# Patient Record
Sex: MALE | Race: WHITE
[De-identification: names, ages, dates, MRNs, and addresses within clinical notes are randomized per-mention and may not be internally consistent; named-entity substitution may affect disease eponyms.]

---

## 2020-04-01 ENCOUNTER — APPOINTMENT (OUTPATIENT)
Dept: MRI IMAGING | Facility: CLINIC | Age: 60
End: 2020-04-01
Payer: OTHER GOVERNMENT

## 2020-04-01 PROBLEM — Z00.00 ENCOUNTER FOR PREVENTIVE HEALTH EXAMINATION: Status: ACTIVE | Noted: 2020-04-01

## 2020-04-01 PROCEDURE — 70551 MRI BRAIN STEM W/O DYE: CPT

## 2020-04-15 ENCOUNTER — APPOINTMENT (OUTPATIENT)
Dept: MRI IMAGING | Facility: CLINIC | Age: 60
End: 2020-04-15
Payer: OTHER GOVERNMENT

## 2020-04-15 PROCEDURE — 74183 MRI ABD W/O CNTR FLWD CNTR: CPT

## 2020-04-15 PROCEDURE — A9585A: CUSTOM

## 2020-05-22 ENCOUNTER — APPOINTMENT (OUTPATIENT)
Dept: MRI IMAGING | Facility: CLINIC | Age: 60
End: 2020-05-22
Payer: OTHER GOVERNMENT

## 2020-05-22 PROCEDURE — 70553 MRI BRAIN STEM W/O & W/DYE: CPT

## 2020-07-08 ENCOUNTER — APPOINTMENT (OUTPATIENT)
Dept: SPINE | Facility: CLINIC | Age: 60
End: 2020-07-08
Payer: OTHER GOVERNMENT

## 2020-07-08 VITALS
RESPIRATION RATE: 18 BRPM | OXYGEN SATURATION: 97 % | BODY MASS INDEX: 37.51 KG/M2 | HEART RATE: 78 BPM | DIASTOLIC BLOOD PRESSURE: 76 MMHG | TEMPERATURE: 36.4 F | HEIGHT: 70 IN | WEIGHT: 262 LBS | SYSTOLIC BLOOD PRESSURE: 138 MMHG

## 2020-07-08 DIAGNOSIS — Z63.5 DISRUPTION OF FAMILY BY SEPARATION AND DIVORCE: ICD-10-CM

## 2020-07-08 DIAGNOSIS — Z87.891 PERSONAL HISTORY OF NICOTINE DEPENDENCE: ICD-10-CM

## 2020-07-08 DIAGNOSIS — Z86.39 PERSONAL HISTORY OF OTHER ENDOCRINE, NUTRITIONAL AND METABOLIC DISEASE: ICD-10-CM

## 2020-07-08 DIAGNOSIS — Z78.9 OTHER SPECIFIED HEALTH STATUS: ICD-10-CM

## 2020-07-08 DIAGNOSIS — Z85.828 PERSONAL HISTORY OF OTHER MALIGNANT NEOPLASM OF SKIN: ICD-10-CM

## 2020-07-08 DIAGNOSIS — Z86.79 PERSONAL HISTORY OF OTHER DISEASES OF THE CIRCULATORY SYSTEM: ICD-10-CM

## 2020-07-08 PROCEDURE — 99244 OFF/OP CNSLTJ NEW/EST MOD 40: CPT

## 2020-07-08 RX ORDER — METFORMIN HYDROCHLORIDE 625 MG/1
TABLET ORAL
Refills: 0 | Status: ACTIVE | COMMUNITY

## 2020-07-08 RX ORDER — TAMSULOSIN HYDROCHLORIDE 0.4 MG/1
0.4 CAPSULE ORAL
Refills: 0 | Status: ACTIVE | COMMUNITY

## 2020-07-08 RX ORDER — CALCIUM CARBONATE/VITAMIN D3 600 MG-10
TABLET ORAL
Refills: 0 | Status: ACTIVE | COMMUNITY

## 2020-07-08 RX ORDER — LISINOPRIL 30 MG/1
TABLET ORAL
Refills: 0 | Status: ACTIVE | COMMUNITY

## 2020-07-08 RX ORDER — EZETIMIBE AND SIMVASTATIN 10; 40 MG/1; MG/1
10-40 TABLET ORAL
Refills: 0 | Status: ACTIVE | COMMUNITY

## 2020-07-08 SDOH — SOCIAL STABILITY - SOCIAL INSECURITY: DISRUPTION OF FAMILY BY SEPARATION AND DIVORCE: Z63.5

## 2020-07-08 NOTE — REVIEW OF SYSTEMS
[As Noted in HPI] : as noted in HPI [Negative] : Heme/Lymph [FreeTextEntry7] : nausea and poor appetite [FreeTextEntry9] : right leg weakness.

## 2020-07-08 NOTE — PHYSICAL EXAM
[Person] : oriented to person [Place] : oriented to place [Short Term Intact] : short term memory intact [Time] : oriented to time [Remote Intact] : remote memory intact [Span Intact] : the attention span was normal [Concentration Intact] : normal concentrating ability [Fluency] : fluency intact [Current Events] : adequate knowledge of current events [Comprehension] : comprehension intact [Past History] : adequate knowledge of personal past history [Vocabulary] : adequate range of vocabulary [Cranial Nerves Oculomotor (III)] : extraocular motion intact [Cranial Nerves Optic (II)] : visual acuity intact bilaterally,  pupils equal round and reactive to light [Cranial Nerves Trigeminal (V)] : facial sensation intact symmetrically [Cranial Nerves Facial (VII)] : face symmetrical [Cranial Nerves Vestibulocochlear (VIII)] : hearing was intact bilaterally [Cranial Nerves Glossopharyngeal (IX)] : tongue and palate midline [Cranial Nerves Accessory (XI - Cranial And Spinal)] : head turning and shoulder shrug symmetric [Motor Tone] : muscle tone was normal in all four extremities [Cranial Nerves Hypoglossal (XII)] : there was no tongue deviation with protrusion [Motor Strength] : muscle strength was normal in all four extremities [No Muscle Atrophy] : normal bulk in all four extremities [4] : L2 Iliopsoas 4/5 [Sensation Tactile Decrease] : light touch was intact [5] : S1 ankle flexors 5/5 [Abnormal Walk] : normal gait [Balance] : balance was intact [2+] : Patella right 2+ [Tremor] : no tremor present [Past-pointing] : there was no past-pointing

## 2020-07-08 NOTE — REASON FOR VISIT
[New Patient Visit] : a new patient visit [Referred By: _________] : Patient was referred by LINDA [FreeTextEntry1] : The patient is here for evaluation of brain lesions.

## 2020-07-08 NOTE — HISTORY OF PRESENT ILLNESS
[de-identified] : LORIE BEJARANO is a 59 year old gentleman who experienced right leg weakness in April and went to [facility] and was evaluated.  He stated that he was found to have brain lesions.  He was treated with radiotherapy by Dr. Infante.  The patient stated that he received ten treatments of whole brain radiation.  He stated that he has had a poor appetite and develops nausea and dizziness.  He remains with right leg weakness and is doing physical therapy.  he stated that it is slowly improving.\par The patient stated that he was found to have a lesion on his kidney which was benign.  He also had two lesions from his back biopsied which were negative.

## 2020-07-08 NOTE — RESULTS/DATA
[FreeTextEntry1] : Lesions in the left posterior frontal and and right posterior temporal.  Also lesion in the right spinal corpus callosum.

## 2020-07-27 ENCOUNTER — APPOINTMENT (OUTPATIENT)
Dept: MRI IMAGING | Facility: CLINIC | Age: 60
End: 2020-07-27
Payer: OTHER GOVERNMENT

## 2020-07-27 PROCEDURE — A9585A: CUSTOM

## 2020-07-27 PROCEDURE — 70553 MRI BRAIN STEM W/O & W/DYE: CPT

## 2020-09-14 ENCOUNTER — APPOINTMENT (OUTPATIENT)
Dept: MRI IMAGING | Facility: CLINIC | Age: 60
End: 2020-09-14
Payer: OTHER GOVERNMENT

## 2020-09-14 PROCEDURE — 70553 MRI BRAIN STEM W/O & W/DYE: CPT

## 2020-09-14 PROCEDURE — A9585A: CUSTOM

## 2021-01-20 ENCOUNTER — APPOINTMENT (OUTPATIENT)
Dept: MRI IMAGING | Facility: CLINIC | Age: 61
End: 2021-01-20
Payer: OTHER GOVERNMENT

## 2021-01-20 PROCEDURE — A9585A: CUSTOM

## 2021-01-20 PROCEDURE — 70553 MRI BRAIN STEM W/O & W/DYE: CPT

## 2021-01-25 ENCOUNTER — APPOINTMENT (OUTPATIENT)
Dept: SPINE | Facility: CLINIC | Age: 61
End: 2021-01-25

## 2021-01-31 ENCOUNTER — NON-APPOINTMENT (OUTPATIENT)
Age: 61
End: 2021-01-31

## 2021-02-03 ENCOUNTER — APPOINTMENT (OUTPATIENT)
Dept: SPINE | Facility: CLINIC | Age: 61
End: 2021-02-03
Payer: OTHER GOVERNMENT

## 2021-02-03 VITALS
SYSTOLIC BLOOD PRESSURE: 155 MMHG | OXYGEN SATURATION: 96 % | HEART RATE: 59 BPM | DIASTOLIC BLOOD PRESSURE: 73 MMHG | WEIGHT: 250 LBS | TEMPERATURE: 98.21 F | HEIGHT: 70 IN | BODY MASS INDEX: 35.79 KG/M2

## 2021-02-03 PROCEDURE — 99213 OFFICE O/P EST LOW 20 MIN: CPT

## 2021-02-03 PROCEDURE — 99072 ADDL SUPL MATRL&STAF TM PHE: CPT

## 2021-02-17 ENCOUNTER — APPOINTMENT (OUTPATIENT)
Dept: RADIATION ONCOLOGY | Facility: CLINIC | Age: 61
End: 2021-02-17
Payer: OTHER GOVERNMENT

## 2021-02-17 VITALS
HEIGHT: 70 IN | WEIGHT: 123.56 LBS | DIASTOLIC BLOOD PRESSURE: 83 MMHG | BODY MASS INDEX: 17.69 KG/M2 | HEART RATE: 84 BPM | TEMPERATURE: 95.9 F | OXYGEN SATURATION: 98 % | RESPIRATION RATE: 18 BRPM | SYSTOLIC BLOOD PRESSURE: 138 MMHG

## 2021-02-17 DIAGNOSIS — Z87.438 PERSONAL HISTORY OF OTHER DISEASES OF MALE GENITAL ORGANS: ICD-10-CM

## 2021-02-17 DIAGNOSIS — F14.90 COCAINE USE, UNSPECIFIED, UNCOMPLICATED: ICD-10-CM

## 2021-02-17 DIAGNOSIS — Z92.3 PERSONAL HISTORY OF IRRADIATION: ICD-10-CM

## 2021-02-17 DIAGNOSIS — F12.90 CANNABIS USE, UNSPECIFIED, UNCOMPLICATED: ICD-10-CM

## 2021-02-17 PROCEDURE — 99204 OFFICE O/P NEW MOD 45 MIN: CPT | Mod: 25,GC

## 2021-02-17 PROCEDURE — 99072 ADDL SUPL MATRL&STAF TM PHE: CPT

## 2021-02-17 NOTE — REASON FOR VISIT
[Consideration for Non-Curative Therapy] : consideration for non-curative therapy for [Brain Metastasis] : brain metastasis [Family Member] : family member

## 2021-02-19 ENCOUNTER — OUTPATIENT (OUTPATIENT)
Dept: OUTPATIENT SERVICES | Facility: HOSPITAL | Age: 61
LOS: 1 days | Discharge: HOME | End: 2021-02-19
Payer: OTHER GOVERNMENT

## 2021-02-19 PROCEDURE — 77263 THER RADIOLOGY TX PLNG CPLX: CPT

## 2021-02-19 PROCEDURE — 99072 ADDL SUPL MATRL&STAF TM PHE: CPT

## 2021-02-19 NOTE — PHYSICAL EXAM
[Sclera] : the sclera and conjunctiva were normal [Outer Ear] : the ears and nose were normal in appearance [] : no respiratory distress [Heart Rate And Rhythm] : heart rate and rhythm were normal [Cervical Lymph Nodes Enlarged Posterior Bilaterally] : posterior cervical [Supraclavicular Lymph Nodes Enlarged Bilaterally] : supraclavicular [Musculoskeletal - Swelling] : no joint swelling [Cranial Nerves Oculomotor (III)] : the extraocular motions were intact [Cranial Nerves Trigeminal (V)] : sensation to the face and masseter strength were intact [Cranial Nerves Facial (VII)] : facial strength was intact bilaterally [Cranial Nerves Vestibulocochlear (VIII)] : hearing was intact [Cranial Nerves Accessory (XI - Cranial And Spinal)] : shoulder shrug was intact bilaterally [Cranial Nerves Hypoglossal (XII)] : there was no tongue deviation with protrusion [Normal] : coordination was normal [Oriented To Time, Place, And Person] : oriented to person, place, and time [Romberg's Sign] : Romberg's sign was negtive [de-identified] : Strength 5/5 b/l upper extremity, 5/5 in LLE, 4/5 in RLE

## 2021-02-19 NOTE — REVIEW OF SYSTEMS
[Fatigue] : fatigue [Visual Disturbances] : visual disturbances [Hoarseness] : hoarseness [Constipation] : constipation [Diarrhea] : diarrhea [Nocturia] : nocturia [Urinary Frequency] : urinary frequency [Muscle Weakness] : muscle weakness [Disturbance Of Gait] : gait disturbance [Skin Rash] : skin rash [Confused] : confusion [Difficulty Walking] : difficulty walking [Negative] : Allergic/Immunologic [Fever] : no fever [Chills] : no chills [Night Sweats] : no night sweats [Recent Change In Weight] : ~T no recent weight change [Eye Pain] : no eye pain [Red Eyes] : eyes not red [Dry Eyes] : no dryness of the eyes [Dysphagia] : no dysphagia [Loss of Hearing] : no loss of hearing [Nosebleeds] : no nosebleeds [Odynophagia] : no odynophagia [Mucosal Pain] : no mucosal pain [Abdominal Pain] : no abdominal pain [Vomiting] : no vomiting [Urinary Ostomy] : no ~T urinary ostomy present [Joint Pain] : no joint pain [Muscle Pain] : no muscle pain [Skin Wound] : no skin wound [Dizziness] : no dizziness [Fainting] : no fainting [FreeTextEntry3] : Blurred vision [FreeTextEntry8] : Incontinence [FreeTextEntry9] : Walks with cane [de-identified] : On back  [de-identified] : Occasional confusion

## 2021-02-19 NOTE — HISTORY OF PRESENT ILLNESS
[FreeTextEntry1] : Mr. Dale Galvez is a 61 yo with multiple brain lesions w/ unknown primary cancer s/p WBRT w/ Dr. Infante. Today he presents with new areas of enhancement and consideration of further radiation therapy.\par \par Brief Oncological History:\par In April he had focal neurological defects including RLE weakness for which MRI on 4/1/2020 showed findings suspicious for intracranial metastatic disease with the largest lesion within the right temporal lobe. \par \par The lesion within the high left posterior frontal lobe may explain the patient's right-sided leg weakness. There is associated mild mass effect and surrounding vasogenic edema surrounding these lesions. Tumefactive demyelinating lesions cannot be completely excluded. Recommend repeat contrast-enhanced MRI examination of the brain for a more definitive evaluation. \par \par A tissue diagnosis could not be obtained because of COVID crisis.\par Completed a course of WBRT - 10 fractions w/ Dr. Infante at [facility].\par \par MRI on 5/22/2020 showed response in multiple lesions such as the left posterior frontal and right posterior temporal cortical and subcortical regions. The spinal corpus callosum lesions showed mixed response. \par \par Subsequently referred to Dr. Bush for tissue diagnosis. At evaluation, new MRI on 1/20/2021 showed \par Interval development of expansile FLAIR hyperintense and enhancing lesion involving the left cerebral peduncle with associated mild restricted diffusion. Additional similar new lesion involving the left frontal subcortical white matter. New punctate foci of enhancement involving the previously noted right occipital periventricular FLAIR hyperintense region.\par \par \par Patient referred for consideration of GK for metastatic brain lesions of unknown primary. Patient today notes continued R sided weakness which initially improved but now returned. He recently finished a course of treatment of steroids, and notes more recently last 3 weeks of poor handwriting and R hand fine motor control. Otherwise no complaints.

## 2021-03-02 ENCOUNTER — OUTPATIENT (OUTPATIENT)
Dept: OUTPATIENT SERVICES | Facility: HOSPITAL | Age: 61
LOS: 1 days | End: 2021-03-02
Payer: OTHER GOVERNMENT

## 2021-03-02 ENCOUNTER — APPOINTMENT (OUTPATIENT)
Dept: SPINE | Facility: AMBULATORY SURGERY CENTER | Age: 61
End: 2021-03-02
Payer: OTHER GOVERNMENT

## 2021-03-02 ENCOUNTER — APPOINTMENT (OUTPATIENT)
Dept: MRI IMAGING | Facility: IMAGING CENTER | Age: 61
End: 2021-03-02

## 2021-03-02 DIAGNOSIS — G93.89 OTHER SPECIFIED DISORDERS OF BRAIN: ICD-10-CM

## 2021-03-02 DIAGNOSIS — C79.31 SECONDARY MALIGNANT NEOPLASM OF BRAIN: ICD-10-CM

## 2021-03-02 PROCEDURE — 61800 APPLY SRS HEADFRAME ADD-ON: CPT

## 2021-03-02 PROCEDURE — 77432 STEREOTACTIC RADIATION TRMT: CPT

## 2021-03-02 PROCEDURE — 77300 RADIATION THERAPY DOSE PLAN: CPT | Mod: 26

## 2021-03-02 PROCEDURE — 77295 3-D RADIOTHERAPY PLAN: CPT | Mod: 26

## 2021-03-02 PROCEDURE — 61798 SRS CRANIAL LESION COMPLEX: CPT

## 2021-03-02 PROCEDURE — 76498 UNLISTED MR PROCEDURE: CPT

## 2021-03-02 PROCEDURE — A9585: CPT

## 2021-03-02 PROCEDURE — 77334 RADIATION TREATMENT AID(S): CPT | Mod: 26

## 2021-03-03 ENCOUNTER — NON-APPOINTMENT (OUTPATIENT)
Age: 61
End: 2021-03-03

## 2021-03-11 ENCOUNTER — NON-APPOINTMENT (OUTPATIENT)
Age: 61
End: 2021-03-11

## 2021-03-16 DIAGNOSIS — G93.89 OTHER SPECIFIED DISORDERS OF BRAIN: ICD-10-CM

## 2021-03-16 DIAGNOSIS — C79.31 SECONDARY MALIGNANT NEOPLASM OF BRAIN: ICD-10-CM

## 2021-03-17 ENCOUNTER — APPOINTMENT (OUTPATIENT)
Dept: SPINE | Facility: CLINIC | Age: 61
End: 2021-03-17
Payer: OTHER GOVERNMENT

## 2021-03-17 VITALS
HEART RATE: 81 BPM | BODY MASS INDEX: 37.22 KG/M2 | TEMPERATURE: 97.7 F | OXYGEN SATURATION: 94 % | SYSTOLIC BLOOD PRESSURE: 131 MMHG | DIASTOLIC BLOOD PRESSURE: 82 MMHG | HEIGHT: 70 IN | WEIGHT: 260 LBS

## 2021-03-17 DIAGNOSIS — G93.89 OTHER SPECIFIED DISORDERS OF BRAIN: ICD-10-CM

## 2021-03-17 PROCEDURE — 99024 POSTOP FOLLOW-UP VISIT: CPT

## 2021-04-12 ENCOUNTER — APPOINTMENT (OUTPATIENT)
Dept: RADIATION ONCOLOGY | Facility: CLINIC | Age: 61
End: 2021-04-12
Payer: OTHER GOVERNMENT

## 2021-04-12 VITALS
SYSTOLIC BLOOD PRESSURE: 159 MMHG | HEART RATE: 65 BPM | BODY MASS INDEX: 37.31 KG/M2 | OXYGEN SATURATION: 98 % | WEIGHT: 260 LBS | RESPIRATION RATE: 18 BRPM | DIASTOLIC BLOOD PRESSURE: 94 MMHG

## 2021-04-12 PROCEDURE — 99024 POSTOP FOLLOW-UP VISIT: CPT | Mod: GC

## 2021-04-12 RX ORDER — DEXAMETHASONE 1 MG/1
1 TABLET ORAL
Refills: 0 | Status: DISCONTINUED | COMMUNITY
End: 2021-04-12

## 2021-04-12 NOTE — REVIEW OF SYSTEMS
[Fatigue: Grade 1 - Fatigue relieved by rest] : Fatigue: Grade 1 - Fatigue relieved by rest [Cognitive Disturbance: Grade 0] : Cognitive Disturbance: Grade 0 [Concentration Impairment: Grade 0] : Concentration Impairment: Grade 0 [Dizziness: Grade 0] : Dizziness: Grade 0  [Facial Muscle Weakness: Grade 0] : Facial Muscle Weakness: Grade 0 [Headache: Grade 0] : Headache: Grade 0 [Lethargy: Grade 0] : Lethargy: Grade 0 [Insomnia: Grade 1 - Mild difficulty falling asleep, staying asleep or waking up early] : Insomnia: Grade 1 - Mild difficulty falling asleep, staying asleep or waking up early [Skin Hyperpigmentation: Grade 0] : Skin Hyperpigmentation: Grade 0 [Dermatitis Radiation: Grade 0] : Dermatitis Radiation: Grade 0

## 2021-04-15 NOTE — HISTORY OF PRESENT ILLNESS
[FreeTextEntry1] : Mr. Dale Galvez is a 59 yo with multiple brain lesions w/ unknown primary cancer s/p 30/10 WBRT w/ Dr. Infante.2 new areas of enhancement in the L ivan and L cerebral peduncle. had GK 20 Gy 3/2/21\par \par Improving clinically, c/o right shoulder pain.

## 2021-04-27 ENCOUNTER — APPOINTMENT (OUTPATIENT)
Dept: MRI IMAGING | Facility: CLINIC | Age: 61
End: 2021-04-27
Payer: OTHER GOVERNMENT

## 2021-04-27 ENCOUNTER — NON-APPOINTMENT (OUTPATIENT)
Age: 61
End: 2021-04-27

## 2021-04-27 PROCEDURE — A9585: CPT | Mod: JW

## 2021-04-27 PROCEDURE — A9585A: CUSTOM

## 2021-04-27 PROCEDURE — 70553 MRI BRAIN STEM W/O & W/DYE: CPT

## 2021-04-28 ENCOUNTER — NON-APPOINTMENT (OUTPATIENT)
Age: 61
End: 2021-04-28

## 2021-06-12 ENCOUNTER — HOSPITAL ENCOUNTER (EMERGENCY)
Age: 61
Discharge: HOME | End: 2021-06-12
Attending: EMERGENCY MEDICINE
Payer: OTHER GOVERNMENT

## 2021-06-12 ENCOUNTER — APPOINTMENT (OUTPATIENT)
Dept: GENERAL RADIOLOGY | Age: 61
End: 2021-06-12
Attending: EMERGENCY MEDICINE
Payer: OTHER GOVERNMENT

## 2021-06-12 VITALS
DIASTOLIC BLOOD PRESSURE: 97 MMHG | TEMPERATURE: 97.9 F | OXYGEN SATURATION: 96 % | SYSTOLIC BLOOD PRESSURE: 163 MMHG | BODY MASS INDEX: 32.93 KG/M2 | RESPIRATION RATE: 17 BRPM | WEIGHT: 230 LBS | HEIGHT: 70 IN | HEART RATE: 73 BPM

## 2021-06-12 DIAGNOSIS — S61.210A LACERATION OF RIGHT INDEX FINGER WITHOUT FOREIGN BODY WITHOUT DAMAGE TO NAIL, INITIAL ENCOUNTER: Primary | ICD-10-CM

## 2021-06-12 PROCEDURE — 99282 EMERGENCY DEPT VISIT SF MDM: CPT

## 2021-06-12 PROCEDURE — 90471 IMMUNIZATION ADMIN: CPT

## 2021-06-12 PROCEDURE — 75810000293 HC SIMP/SUPERF WND  RPR

## 2021-06-12 PROCEDURE — 74011250636 HC RX REV CODE- 250/636: Performed by: NURSE PRACTITIONER

## 2021-06-12 PROCEDURE — 73130 X-RAY EXAM OF HAND: CPT

## 2021-06-12 PROCEDURE — 90715 TDAP VACCINE 7 YRS/> IM: CPT | Performed by: NURSE PRACTITIONER

## 2021-06-12 RX ORDER — CEPHALEXIN 500 MG/1
1000 CAPSULE ORAL 2 TIMES DAILY
Qty: 28 CAPSULE | Refills: 0 | Status: SHIPPED | OUTPATIENT
Start: 2021-06-12 | End: 2021-06-19

## 2021-06-12 RX ADMIN — TETANUS TOXOID, REDUCED DIPHTHERIA TOXOID AND ACELLULAR PERTUSSIS VACCINE, ADSORBED 0.5 ML: 5; 2.5; 8; 8; 2.5 SUSPENSION INTRAMUSCULAR at 18:51

## 2021-06-12 NOTE — ED TRIAGE NOTES
Patient presents to ED with c/o finger laceration that happened about 15 minutes prior to ED arrival. Laceration to right hand, second digit. Bleeding controlled and edged approximated. Patient shares that he tripped and landed on a piece of pottery. Patient denies head contact or LOC. TDap will need to be updated.

## 2021-06-12 NOTE — ED PROVIDER NOTES
EMERGENCY DEPARTMENT HISTORY AND PHYSICAL EXAM    6:34 PM      Date: 6/12/2021  Patient Name: Celine Acosta    History of Presenting Illness     Chief Complaint   Patient presents with    Laceration         History Provided By: Patient    Additional History (Context): Celine Acosta is a 56 y.o. male with no significant past medical history who presents with complaints of a laceration to the right index finger middle phalanx that occurred just prior to arrival.  States he was walking up a step and tripped hitting his hand against a ceramic planter which broke causing a laceration to the finger. He is not on any blood thinners. He does have some numbness to the distal tip. Bleeding controlled with pressure. Right-hand-dominant. No prior injuries to this extremity. PCP: Unknown, Provider    Current Outpatient Medications   Medication Sig Dispense Refill    cephALEXin (Keflex) 500 mg capsule Take 2 Capsules by mouth two (2) times a day for 7 days. 28 Capsule 0       Past History     Past Medical History:  No past medical history on file. Past Surgical History:  No past surgical history on file. Family History:  No family history on file. Social History:  Social History     Tobacco Use    Smoking status: Not on file   Substance Use Topics    Alcohol use: Not on file    Drug use: Not on file       Allergies:  No Known Allergies      Review of Systems       Review of Systems   Constitutional: Negative. Negative for chills and fever. HENT: Negative. Negative for congestion, ear pain and rhinorrhea. Eyes: Negative. Negative for pain and redness. Respiratory: Negative. Negative for cough and shortness of breath. Cardiovascular: Negative. Negative for chest pain, palpitations and leg swelling. Gastrointestinal: Negative. Negative for abdominal pain, constipation, diarrhea, nausea and vomiting. Genitourinary: Negative. Negative for dysuria, frequency, hematuria and urgency. Musculoskeletal: Positive for arthralgias (Right index finger pain). Negative for back pain, gait problem, joint swelling and neck pain. Skin: Positive for wound (laceration right index finger middle phalanx). Negative for rash. Neurological: Positive for numbness (Tip of right index finger). Negative for dizziness, seizures, speech difficulty, weakness, light-headedness and headaches. Hematological: Negative for adenopathy. Does not bruise/bleed easily. All other systems reviewed and are negative. Physical Exam     Visit Vitals  BP (!) 163/97 (BP 1 Location: Left upper arm, BP Patient Position: At rest)   Pulse 73   Temp 97.9 °F (36.6 °C)   Resp 17   Ht 5' 10\" (1.778 m)   Wt 104.3 kg (230 lb)   SpO2 96%   BMI 33.00 kg/m²         Physical Exam  Vitals and nursing note reviewed. Constitutional:       General: He is not in acute distress. Appearance: Normal appearance. He is normal weight. He is not ill-appearing, toxic-appearing or diaphoretic. HENT:      Head: Normocephalic and atraumatic. Eyes:      General: Vision grossly intact. Gaze aligned appropriately. Right eye: No discharge. Left eye: No discharge. Conjunctiva/sclera: Conjunctivae normal.      Pupils: Pupils are equal, round, and reactive to light. Cardiovascular:      Rate and Rhythm: Normal rate and regular rhythm. Pulses: Normal pulses. Heart sounds: Normal heart sounds. No murmur heard. No friction rub. No gallop. Pulmonary:      Effort: Pulmonary effort is normal. No respiratory distress. Breath sounds: Normal breath sounds. No stridor. No wheezing, rhonchi or rales. Chest:      Chest wall: No tenderness. Abdominal:      General: Abdomen is flat. Palpations: Abdomen is soft. Tenderness: There is no abdominal tenderness. Musculoskeletal:         General: Normal range of motion. Right wrist: Normal. No snuff box tenderness.       Right hand: Laceration (Right index finger, middle phalanx2 cm) present. No swelling or deformity. Normal range of motion. Normal strength. Decreased sensation. Normal capillary refill. Cervical back: Full passive range of motion without pain, normal range of motion and neck supple. Skin:     General: Skin is warm and dry. Capillary Refill: Capillary refill takes less than 2 seconds. Neurological:      General: No focal deficit present. Mental Status: He is alert and oriented to person, place, and time. Psychiatric:         Mood and Affect: Mood normal.         Behavior: Behavior normal.             Diagnostic Study Results     Labs -  No results found for this or any previous visit (from the past 12 hour(s)). Radiologic Studies -   XR HAND RT MIN 3 V    (Results Pending)         Medical Decision Making   I am the first provider for this patient. I reviewed available nursing notes, past medical history, past surgical history, family history and social history. Vital Signs-Reviewed the patient's vital signs. Records Reviewed: Nursing Notes and Old Medical Records (Time of Review: 6:34 PM)    Pulse Oximetry Analysis - 96% on RA- normal     ED Course: Progress Notes, Reevaluation, and Consults:  6:34 PM  Initial assessment performed. The patients presenting problems have been discussed, and they/their family are in agreement with the care plan formulated and outlined with them. I have encouraged them to ask questions as they arise throughout their visit. 8:17 PM x-ray negative for acute bony abnormalities or foreign body. Preliminary read done by me and pending official overread.     Wound Repair    Date/Time: 6/12/2021 8:11 PM  Performed by: NPPreparation: skin prepped with Shur-Clens and skin prepped with Betadine  Location details: right index finger  Wound length:2.5 cm or less  Anesthesia: nerve block    Anesthesia:  Local Anesthetic: lidocaine 1% without epinephrine  Anesthetic total: 5 mL  Foreign bodies: no foreign bodies  Irrigation solution: saline  Irrigation method: jet lavage  Debridement: extensive  Wound skin closure material used: 5-0 prolene. Number of sutures: 5  Technique: simple  Approximation: close  Dressing: splint (Xeroform gauze, Kerlix)  Patient tolerance: patient tolerated the procedure well with no immediate complications  My total time at bedside, performing this procedure was 16-30 minutes. 8:20 PM patient was anesthetized appropriately and anesthesia was achieved. In a bloodless field I examined the wound extensively after pressure irrigation and no evidence of injury to the tendon. He has 5 out of 5 strength with against resistance with both flexion and extension at the DIP and PIP joints. Patient was splinted by myself and he remained neurovascularly intact distally. Discussed need for empiric antibiotics with cephalexin due to potential contamination. Patient was given a tetanus booster. Patient is to clean the wound every day with soap and water and inspect at least twice a day for signs of infection. Discussed close follow-up in 2 days for wound evaluation and recheck and in 7 days sutures need to be removed. Discussed follow-up with hand surgeon as he had some paresthesias to the distal tip of the finger on both ulnar and lateral aspects. ER return precautions discussed. Provider Notes (Medical Decision Making):     76-year-old male patient presents to the ER with a laceration to the right index finger. This was sustained after a trip and fall hitting his hand against a ceramic planter which shattered. He has some paresthesias in the distal tip of the finger and bleeding is controlled with pressure. He is not on any blood thinners. Full active range of motion. Will obtain appropriate studies to evaluate patient's complaints and treat symptomatically.  Will disposition after reassessment assuming no clinical change or worsening and appropriate response to symptomatic treatment. Diagnosis     Clinical Impression:   1. Laceration of right index finger without foreign body without damage to nail, initial encounter        Disposition: d/c     DISCHARGE NOTE:     Patient has been reexamined. Patient has no new complaints, changes, or physical findings. Care plan outlined and precautions discussed. Results of x-ray and physical exam findings were reviewed with the patient and family. All medications were reviewed with the patient; will discharge home with cephalexin. All of patient's questions and concerns were addressed. Patient was instructed and agrees to follow up with hand surgery, as well as to return to the ED upon further deterioration. Patient is ready to go home. Follow-up Information     Follow up With Specialties Details Why Contact Info    Estefani Chandra,  Hand Surgery Schedule an appointment as soon as possible for a visit  Follow-up from the Emergency Department [address] [address]  [phone]      [facility] EMERGENCY DEPT Emergency Medicine In 2 days For wound re-check [address] [facility] [address] [facility]    [facility] EMERGENCY DEPT Emergency Medicine In 7 days For suture removal [address] [facility] [id number] [phone]           There are no discharge medications for this patient. Dictation disclaimer:  Please note that this dictation was completed with [employer], the computer voice recognition software. Quite often unanticipated grammatical, syntax, homophones, and other interpretive errors are inadvertently transcribed by the computer software. Please disregard these errors. Please excuse any errors that have escaped final proofreading.

## 2021-06-14 ENCOUNTER — HOSPITAL ENCOUNTER (EMERGENCY)
Age: 61
Discharge: HOME | End: 2021-06-14
Attending: EMERGENCY MEDICINE
Payer: OTHER GOVERNMENT

## 2021-06-14 VITALS
HEART RATE: 80 BPM | RESPIRATION RATE: 18 BRPM | TEMPERATURE: 97.9 F | OXYGEN SATURATION: 95 % | SYSTOLIC BLOOD PRESSURE: 140 MMHG | DIASTOLIC BLOOD PRESSURE: 91 MMHG

## 2021-06-14 DIAGNOSIS — Z51.89 VISIT FOR WOUND CHECK: Primary | ICD-10-CM

## 2021-06-14 PROCEDURE — 75810000275 HC EMERGENCY DEPT VISIT NO LEVEL OF CARE

## 2021-06-14 NOTE — ED PROVIDER NOTES
EMERGENCY DEPARTMENT HISTORY AND PHYSICAL EXAM    Date: 6/14/2021  Patient Name: Zandra Skiff    History of Presenting Illness     Chief Complaint   Patient presents with    Wound Check         History Provided By: Patient        Additional History (Context): Zandra Skiff is a 56 y.o. male with obesity who presents with finger laceration repair wound check. Sutures placed here in this facility 2 days ago. Started taking the antibiotics today. Has not had a dressing change. PCP: Unknown, Provider    Current Outpatient Medications   Medication Sig Dispense Refill    cephALEXin (Keflex) 500 mg capsule Take 2 Capsules by mouth two (2) times a day for 7 days. 28 Capsule 0       Past History     Past Medical History:  No past medical history on file. Past Surgical History:  No past surgical history on file. Family History:  No family history on file. Social History:  Social History     Tobacco Use    Smoking status: Not on file   Substance Use Topics    Alcohol use: Not on file    Drug use: Not on file       Allergies:  No Known Allergies      Review of Systems   Review of Systems   Constitutional: Negative for fever. Skin: Positive for wound. All Other Systems Negative  Physical Exam     Vitals:    06/14/21 1747 06/14/21 1751   BP: (!) 140/91    Pulse: 80    Resp: 18    Temp:  97.9 °F (36.6 °C)   SpO2: 95%      Physical Exam  Vitals and nursing note reviewed. Constitutional:       General: He is not in acute distress. Appearance: He is well-developed. He is obese. He is not ill-appearing, toxic-appearing or diaphoretic. HENT:      Head: Normocephalic and atraumatic. Neck:      Thyroid: No thyromegaly. Vascular: No carotid bruit. Trachea: No tracheal deviation. Cardiovascular:      Rate and Rhythm: Normal rate and regular rhythm. Heart sounds: Normal heart sounds. No murmur heard. No friction rub. No gallop.     Pulmonary:      Effort: Pulmonary effort is normal. No respiratory distress. Breath sounds: Normal breath sounds. No stridor. No wheezing or rales. Chest:      Chest wall: No tenderness. Abdominal:      General: There is no distension. Palpations: Abdomen is soft. There is no mass. Tenderness: There is no abdominal tenderness. There is no guarding or rebound. Musculoskeletal:         General: Normal range of motion. Cervical back: Normal range of motion and neck supple. Skin:     General: Skin is warm and dry. Coloration: Skin is not pale. Findings: Lesion present. Comments: Left distal index finger laceration repair feels to be healing well. Cap refill less than 2 seconds. No drainage redness or swelling. No discharge or streaking. Sensation intact. Neurological:      Mental Status: He is alert. Psychiatric:         Speech: Speech normal.         Behavior: Behavior normal.         Thought Content: Thought content normal.         Judgment: Judgment normal.          Diagnostic Study Results     Labs -   No results found for this or any previous visit (from the past 12 hour(s)). Radiologic Studies -   No orders to display     CT Results  (Last 48 hours)    None        CXR Results  (Last 48 hours)    None            Medical Decision Making   I am the first provider for this patient. I reviewed the vital signs, available nursing notes, past medical history, past surgical history, family history and social history. Vital Signs-Reviewed the patient's vital signs. Procedures:  Procedures    Provider Notes (Medical Decision Making): Rewrapped resplinted discharge and instructed to have sutures removed in 12 more days. MED RECONCILIATION:  No current facility-administered medications for this encounter. Current Outpatient Medications   Medication Sig    cephALEXin (Keflex) 500 mg capsule Take 2 Capsules by mouth two (2) times a day for 7 days.        Disposition:  home    DISCHARGE NOTE:   6:08 PM    Pt has been reexamined. Patient has no new complaints, changes, or physical findings. Care plan outlined and precautions discussed. Results of exam were reviewed with the patient. All medications were reviewed with the patient. All of pt's questions and concerns were addressed. Patient was instructed and agrees to follow up with PCP, as well as to return to the ED upon further deterioration. Patient is ready to go home. Follow-up Information     Follow up With Specialties Details Why [address]  Schedule an appointment as soon as possible for a visit in 12 days For suture removal [address] [id number]  [phone]    [address] EMERGENCY DEPT Emergency Medicine  If symptoms worsen return immediately [address] [place] [id number]  [phone]          Current Discharge Medication List            Diagnosis     Clinical Impression:   1.  Visit for wound check

## 2021-06-22 ENCOUNTER — HOSPITAL ENCOUNTER (OUTPATIENT)
Dept: PHYSICAL THERAPY | Age: 61
Discharge: HOME | End: 2021-06-22
Payer: OTHER GOVERNMENT

## 2021-06-22 PROCEDURE — 97162 PT EVAL MOD COMPLEX 30 MIN: CPT

## 2021-06-22 PROCEDURE — 97110 THERAPEUTIC EXERCISES: CPT

## 2021-06-22 PROCEDURE — 97116 GAIT TRAINING THERAPY: CPT

## 2021-06-22 NOTE — PROGRESS NOTES
[facility] [address], [address] [phone] fax    Plan of Care/ Statement of Necessity for Physical Therapy Services    Patient name: Shanta Oneill Start of Care: 2021   Referral source: Jon Parish MD : 1960    Medical Diagnosis: Weakness of right lower extremity [R29.898]  Payor: SELENA / Plan: Celestino Hernandez 74 / Product Type: Carylon Locker /    Onset Date:    Treatment Diagnosis: right LE weakness   Prior Hospitalization: see medical history Provider#: [id number]   Medications: Verified on Patient summary List    Comorbidities: CA with brain metastases, HTN, dupuytren contractures B hands   Prior Level of Function: retired [employer], recently moved from [place] to live with son and DIL in 2 story home. Uses SBQC for mobility, generalized deconditioning and right LE weakness progressively worsening over the past year. Limited right hand fine motor tasks         The Plan of Care and following information is based on the information from the initial evaluation. Assessment/ key information: Patient is a 56 y.o.male presenting with Weakness of right lower extremity [R29.898]. Mr. Kathy Muir presents to initial PT evaluation with c/o progressive right LE weakness related for metastatic CA over the past 2 years. He has had radiation treatment with success and reports improvement in daily function. He displays noted deconditioning and impaired control of the right LE as well as weakness. Gait and balance are altered and patient is at risk for fall per balance assessment. Recommended Mr. Andrade use rolling walking for now as he is much safer and more efficient with gait vs use of SBQC. Patient will benefit from skilled PT services to address deficits and facilitate return to premorbid activity level and promote improved quality of life.    Of note, patient is also c/o right hand weakness/impaired functional control and fine motor tasks. Advised him that he would benefit from occupational therapy and recommended he f/u with MD in this regard for a order. Evaluation Complexity History MEDIUM  Complexity : 1-2 comorbidities / personal factors will impact the outcome/ POC ; Examination MEDIUM Complexity : 3 Standardized tests and measures addressing body structure, function, activity limitation and / or participation in recreation  ;Presentation HIGH Complexity : Unstable and unpredictable characteristics  ; Clinical Decision Making Other outcome measures TU\"  HIGH   Overall Complexity Rating: MEDIUM  Problem List: pain affecting function, decrease ROM, decrease strength, edema affecting function, impaired gait/ balance, decrease ADL/ functional abilitiies, decrease activity tolerance, decrease flexibility/ joint mobility and decrease transfer abilities   Treatment Plan may include any combination of the following: Therapeutic exercise, Therapeutic activities, Neuromuscular re-education, Physical agent/modality, Gait/balance training, Manual therapy, Aquatic therapy, Patient education, Self Care training, Functional mobility training, Home safety training and Stair training  Patient / Family readiness to learn indicated by: asking questions, trying to perform skills and interest  Persons(s) to be included in education: patient (P)  Barriers to Learning/Limitations: None  Patient Goal (s): strengthen my leg.   Patient Self Reported Health Status: fair  Rehabilitation Potential: fair  Short Term Goals: To be accomplished in 1 weeks:  1. Therapist to establish HEP for strength/gait & balance training to decrease fall risk. Long Term Goals: To be accomplished in 10 treatments:  1. Patient will be independent with HEP to improve carryover of functional gains with ADLs between visits. Eval Status:n/a  2. Pt will decrease leonardo on TUG with AD to < 15 seconds to demonstrate decreased fall risk. Eval Status:21\"  3.  Pt will increase FOTO score to set d/c points to demonstrate increased ease with ADLs. Eval Status: FOTO: time constraint at evaluation - to be assessed nv  4. Pt will increase B hip flexion/abduction to 5/5 with MMT to improve ease with gait & safety with ambulation. Eval Status:   right hip flexion: 4/5  right hip abduction: 3/5  left hip flexion: 5/5  Left hip abduction: 4+/5    Frequency / Duration: Patient to be seen 2-3 times per week for 10 treatments. Patient/ Caregiver education and instruction: Diagnosis, prognosis, self care, activity modification and exercises   [x]  Plan of care has been reviewed with CARMENZA Canales, PT 6/22/2021 11:58 AM    ________________________________________________________________________    I certify that the above Therapy Services are being furnished while the patient is under my care. I agree with the treatment plan and certify that this therapy is necessary.     [id number] Signature:____________Date:_________TIME:________     Alivia Bacon MD  ** Signature, Date and Time must be completed for valid certification **    Please sign and return to [facility] [address], [address] [phone] fax

## 2021-06-22 NOTE — PROGRESS NOTES
PT DAILY TREATMENT NOTE - [facility]     Patient Name: Alfredo Jones  Date:2021  : 1960  [x]  Patient  Verified  Payor:  / Plan: Celestino Hernandez 74 / Product Type:  /    In time:1200  Out time:1250  Total Treatment Time (min): 50  Visit #: 1 of 10    Treatment Area: Weakness of right lower extremity [R29.898]    SUBJECTIVE  Pain Level (0-10 scale): 0  Any medication changes, allergies to medications, adverse drug reactions, diagnosis change, or new procedure performed?: [x] No    [] Yes (see summary sheet for update)  Subjective functional status:   [x] See Eval form in paper chart      OBJECTIVE    30 min [x]Eval                  []Re-Eval       10 min Therapeutic Exercise:  [x] See flow sheet :HEP   Rationale: increase ROM, increase strength, improve coordination, improve balance and increase proprioception to improve the patients ability to perform bed mobility and ALDs. 10 min Gait Training: with SBQC vs RW X 100' with supervision to min A to prevent LOB, emphasis on foot clearance   Rationale: normalize gait. With   [] TE   [] TA   [] neuro   [] other: Patient Education: [x] Review HEP    [] Progressed/Changed HEP based on:   [] positioning   [] body mechanics   [] transfers   [] heat/ice application    [] other:                  Pain Level (0-10 scale) post treatment: 0    ASSESSMENT:   [x]  See Evaluation         Goals:  Short Term Goals: To be accomplished in 1 weeks:  1. Therapist to establish HEP for strength/gait & balance training to decrease fall risk. Long Term Goals: To be accomplished in 10 treatments:  1. Patient will be independent with HEP to improve carryover of functional gains with ADLs between visits. Eval Status:n/a  2. Pt will decrease leonardo on TUG with AD to < 15 seconds to demonstrate decreased fall risk. Eval Status:21\"  3. Pt will increase FOTO score to set d/c points to demonstrate increased ease with ADLs.      Eval Status: FOTO: time constraint at evaluation - to be assessed nv  4. Pt will increase B hip flexion/abduction to 5/5 with MMT to improve ease with gait & safety with ambulation.    Eval Status:   right hip flexion: 4/5  right hip abduction: 3/5  left hip flexion: 5/5  Left hip abduction: 4+/5    PLAN      [x]  Continue plan of care    []  Other:_      Ivet Nino, PT 6/22/2021  11:59 AM

## 2021-06-28 ENCOUNTER — HOSPITAL ENCOUNTER (OUTPATIENT)
Dept: PHYSICAL THERAPY | Age: 61
Discharge: HOME | End: 2021-06-28
Payer: OTHER GOVERNMENT

## 2021-06-28 PROCEDURE — 97116 GAIT TRAINING THERAPY: CPT

## 2021-06-28 PROCEDURE — 97112 NEUROMUSCULAR REEDUCATION: CPT

## 2021-06-28 PROCEDURE — 97110 THERAPEUTIC EXERCISES: CPT

## 2021-06-28 NOTE — PROGRESS NOTES
PT DAILY TREATMENT NOTE     Patient Name: Pham Solomon  Date:2021  : 1960  [x]  Patient  Verified  Payor:  / Plan: Celestino Hernandez 74 / Product Type:  /    In time:815  Out time:911  Total Treatment Time (min): 56  Visit #: 2 of 10    Treatment Area: Weakness of right lower extremity [R29.898]    SUBJECTIVE  Pain Level (0-10 scale): 0  Any medication changes, allergies to medications, adverse drug reactions, diagnosis change, or new procedure performed?: [x] No    [] Yes (see summary sheet for update)  Subjective functional status/changes:   [] No changes reported  \"No pain, just weakness. \"    OBJECTIVE    Modality rationale: patient declined   Min Type Additional Details    [] Estim:  []Unatt       []IFC  []Premod                        []Other:  []w/ice   []w/heat  Position:  Location:    [] Estim: []Att    []TENS instruct  []NMES                    []Other:  []w/US   []w/ice   []w/heat  Position:  Location:    []  Traction: [] Cervical       []Lumbar                       [] Prone          []Supine                       []Intermittent   []Continuous Lbs:  [] before manual  [] after manual    []  Ultrasound: []Continuous   [] Pulsed                           []1MHz   []3MHz W/cm2:  Location:    []  Iontophoresis with dexamethasone         Location: [] Take home patch   [] In clinic    []  Ice     []  heat  []  Ice massage  []  Laser   []  Anodyne Position:  Location:    []  Laser with stim  []  Other:  Position:  Location:    []  Vasopneumatic Device    []  Right     []  Left  Pre-treatment girth:  Post-treatment girth:  Measured at (location):  Pressure:       [] lo [] med [] hi   Temperature: [] lo [] med [] hi   [] Skin assessment post-treatment:  []intact []redness- no adverse reaction    []redness  adverse reaction:     23 min Therapeutic Exercise:  [x] See flow sheet :   Rationale: increase ROM and increase strength to improve the patients ability to perform ADLs    23 min Neuromuscular Re-education:  [x]  See flow sheet : balance training   Rationale: increase ROM, increase strength, improve coordination, improve balance and increase proprioception  to improve the patients ability to improve mobility and decrease fall risk     10 min Gait Training:  with SBQC vs RW X 100' with supervision to min A to prevent LOB, emphasis on foot clearance   Rationale: to promote functional independence with gait and community ambulation          With   [x] TE   [] TA   [x] neuro   [] other: Patient Education: [x] Review HEP    [] Progressed/Changed HEP based on:   [x] positioning   [x] body mechanics   [] transfers   [] heat/ice application    [] other:      Other Objective/Functional Measures:   FOTO 49     Pain Level (0-10 scale) post treatment: 0    ASSESSMENT/Changes in Function: Initiated POC. Pt needs frequent cuing to improve heel strike on the right. Balance challenged in NBOS and with EC on firm surface. Requires frequent seated rest breaks. Patient will continue to benefit from skilled PT services to modify and progress therapeutic interventions, address functional mobility deficits, address ROM deficits, address strength deficits, analyze and address soft tissue restrictions, analyze and cue movement patterns, analyze and modify body mechanics/ergonomics, assess and modify postural abnormalities, address imbalance/dizziness and instruct in home and community integration to attain remaining goals. [x]  See Plan of Care  []  See progress note/recertification  []  See Discharge Summary         Progress towards goals / Updated goals:  Short Term Goals: To be accomplished in 1 weeks:  1. Therapist to establish HEP for strength/gait & balance training to decrease fall risk.     MET  Long Term Goals: To be accomplished in 10 treatments:  1. Patient will be independent with HEP to improve carryover of functional gains with ADLs between visits.                [place] Status:n/a   Reports initial compliance   2. Pt will decrease leonardo on TUG with AD to < 15 seconds to demonstrate decreased fall risk. Eval Status:21\"   Assess at later visit  3. Pt will increase FOTO score to set d/c points to demonstrate increased ease with ADLs. Eval Status: FOTO: time constraint at evaluation - to be assessed nv   49 with predicted outcome of 54  4. Pt will increase B hip flexion/abduction to 5/5 with MMT to improve ease with gait & safety with ambulation.    Eval Status:   right hip flexion: 4/5  right hip abduction: 3/5  left hip flexion: 5/5   Left hip abduction: 4+/5   Too soon to see appreciable change    PLAN  []  Upgrade activities as tolerated     [x]  Continue plan of care  []  Update interventions per flow sheet       []  Discharge due to:_  []  Other:_      Hollie Barroso PTA, [facility] [place] 6/28/2021  9:23 AM    Future Appointments   Date Time Provider Leilani Osborn   7/2/2021 12:00 PM Spout [place] Rei, PTA [facility]   7/7/2021 12:00 PM Spout Spring [place], PTA [facility]   7/9/2021 12:00 PM Mikael [place], PTA [facility]   7/12/2021 12:00 PM Morgan Ala, PT [facility]   7/14/2021 12:00 PM Spout Spring [place], PTA [facility]   7/19/2021 12:00 PM Morgan Ala, PT [facility]   7/21/2021 12:00 PM Mikael [place], PTA [facility]   7/26/2021 12:00 PM Morgan Ala, PT [facility]   7/28/2021 12:00 PM Spout [place] Rei, PTA [facility]

## 2021-07-02 ENCOUNTER — HOSPITAL ENCOUNTER (OUTPATIENT)
Dept: PHYSICAL THERAPY | Age: 61
Discharge: HOME | End: 2021-07-02
Payer: OTHER GOVERNMENT

## 2021-07-02 PROCEDURE — 97530 THERAPEUTIC ACTIVITIES: CPT

## 2021-07-02 PROCEDURE — 97116 GAIT TRAINING THERAPY: CPT

## 2021-07-02 PROCEDURE — 97112 NEUROMUSCULAR REEDUCATION: CPT

## 2021-07-02 NOTE — PROGRESS NOTES
PT DAILY TREATMENT NOTE     Patient Name: Pham Solomon  Date:2021  : 1960  [x]  Patient  Verified  Payor:  / Plan: Celestino Hernandez 74 / Product Type:  /    In time:1207  Out time:1243  Total Treatment Time (min): 36  Visit #: 3 of 10    Treatment Area: Weakness of right lower extremity [R29.898]    SUBJECTIVE  Pain Level (0-10 scale): 0  Any medication changes, allergies to medications, adverse drug reactions, diagnosis change, or new procedure performed?: [x] No    [] Yes (see summary sheet for update)  Subjective functional status/changes:   [] No changes reported  \"No pain. \"    OBJECTIVE    Modality rationale: patient declined   Min Type Additional Details    [] Estim:  []Unatt       []IFC  []Premod                        []Other:  []w/ice   []w/heat  Position:  Location:    [] Estim: []Att    []TENS instruct  []NMES                    []Other:  []w/US   []w/ice   []w/heat  Position:  Location:    []  Traction: [] Cervical       []Lumbar                       [] Prone          []Supine                       []Intermittent   []Continuous Lbs:  [] before manual  [] after manual    []  Ultrasound: []Continuous   [] Pulsed                           []1MHz   []3MHz W/cm2:  Location:    []  Iontophoresis with dexamethasone         Location: [] Take home patch   [] In clinic    []  Ice     []  heat  []  Ice massage  []  Laser   []  Anodyne Position:  Location:    []  Laser with stim  []  Other:  Position:  Location:    []  Vasopneumatic Device    []  Right     []  Left  Pre-treatment girth:  Post-treatment girth:  Measured at (location):  Pressure:       [] lo [] med [] hi   Temperature: [] lo [] med [] hi   [] Skin assessment post-treatment:  []intact []redness- no adverse reaction    []redness  adverse reaction:     10 min Therapeutic Activity:  [x]  See flow sheet : sit to stands, functional standing activities   Rationale: increase ROM, increase strength, improve coordination, improve balance and increase proprioception  to improve the patients ability to improve mobility and ADL performance     16 min Neuromuscular Re-education:  [x]  See flow sheet : balance training   Rationale: increase ROM, increase strength, improve coordination, improve balance and increase proprioception  to improve the patients ability to improve mobility and decrease fall risk     10 min Gait Training:  with SBQC vs RW X 100' each with supervision to min A to prevent LOB, emphasis on foot clearance   Rationale: to promote functional independence and gait          With   [x] TE   [x] TA   [x] neuro   [] other: Patient Education: [x] Review HEP    [] Progressed/Changed HEP based on:   [x] positioning   [x] body mechanics   [] transfers   [] heat/ice application    [] other:      Other Objective/Functional Measures:      Pain Level (0-10 scale) post treatment: 0    ASSESSMENT/Changes in Function: Pt continues to have difficulty with foot clearance with both RW and SBQC, but worse with SBQC. Had pt switch SBQC to left hand and he was able to ambulate with better quality vs in the right hand. He does well with static balance activities, but requires min to mod A with more dynamic activities. Continues to require cuing for safety throughout treatment with transfers. Patient will continue to benefit from skilled PT services to modify and progress therapeutic interventions, address functional mobility deficits, address ROM deficits, address strength deficits, analyze and address soft tissue restrictions, analyze and cue movement patterns, analyze and modify body mechanics/ergonomics, assess and modify postural abnormalities, address imbalance/dizziness and instruct in home and community integration to attain remaining goals.      [x]  See Plan of Care  []  See progress note/recertification  []  See Discharge Summary         Progress towards goals / Updated goals:  Short Term Goals: To be accomplished in 1 weeks:  1. Therapist to establish HEP for strength/gait & balance training to decrease fall risk.               MET  Long Term Goals: To be accomplished in 10 treatments:  1. Patient will be independent with HEP to improve carryover of functional gains with ADLs between visits.             Eval Status:n/a              Reports initial compliance   2. Pt will decrease leonardo on TUG with AD to < 15 seconds to demonstrate decreased fall risk.              Eval Status:21\"              Assess at later visit  3. Pt will increase FOTO score to set d/c points to demonstrate increased ease with ADLs.                Eval Status: FOTO: time constraint at evaluation - to be assessed nv              49 with predicted outcome of 54  4. Pt will increase B hip flexion/abduction to 5/5 with MMT to improve ease with gait & safety with ambulation.    Eval Status:   right hip flexion: 4/5  right hip abduction: 3/5  left hip flexion: 5/5              Left hip abduction: 4+/5              Too soon to see appreciable change    PLAN  []  Upgrade activities as tolerated     [x]  Continue plan of care  []  Update interventions per flow sheet       []  Discharge due to:_  []  Other:_      Kulwant Judge PTA, CSCS 7/2/2021  1:29 PM    Future Appointments   Date Time Provider Leilani Osborn   7/7/2021 12:00 PM Isaias Miller PTA [facility]   7/9/2021 12:00 PM Isaias Miller, PTA [facility]   7/12/2021 12:00 PM Paulina Gomez, PT [facility]   7/14/2021 12:00 PM Isaias Miller, PTA [facility]   7/19/2021 12:00 PM Paulina Gomez, PT [facility]   7/21/2021 12:00 PM Isaias Miller, PTA [facility]   7/26/2021 12:00 PM Paulina Gomez, PT [facility]   7/28/2021 12:00 PM Isaias Miller, PTA [facility]

## 2021-07-07 ENCOUNTER — HOSPITAL ENCOUNTER (OUTPATIENT)
Dept: PHYSICAL THERAPY | Age: 61
Discharge: HOME | End: 2021-07-07
Payer: OTHER GOVERNMENT

## 2021-07-07 ENCOUNTER — APPOINTMENT (OUTPATIENT)
Dept: PHYSICAL THERAPY | Age: 61
End: 2021-07-07

## 2021-07-07 PROCEDURE — 97112 NEUROMUSCULAR REEDUCATION: CPT

## 2021-07-07 PROCEDURE — 97116 GAIT TRAINING THERAPY: CPT

## 2021-07-07 PROCEDURE — 97530 THERAPEUTIC ACTIVITIES: CPT

## 2021-07-07 PROCEDURE — 97110 THERAPEUTIC EXERCISES: CPT

## 2021-07-07 NOTE — PROGRESS NOTES
PT DAILY TREATMENT NOTE     Patient Name: Kel Saxena  Date:2021  : 1960  [x]  Patient  Verified  Payor: SELENA / Plan: Celestino Hernandez 74 / Product Type: Nicolasa Mcfarland /    In time:1158  Out time:1240  Total Treatment Time (min): 42  Visit #: 4 of 10    Treatment Area: Weakness of right lower extremity [R29.898]    SUBJECTIVE  Pain Level (0-10 scale): 0  Any medication changes, allergies to medications, adverse drug reactions, diagnosis change, or new procedure performed?: [x] No    [] Yes (see summary sheet for update)  Subjective functional status/changes:   [] No changes reported  \"No pain. I think I'm getting weaker. \"    OBJECTIVE    Modality rationale: patient declined   Min Type Additional Details    [] Estim:  []Unatt       []IFC  []Premod                        []Other:  []w/ice   []w/heat  Position:  Location:    [] Estim: []Att    []TENS instruct  []NMES                    []Other:  []w/US   []w/ice   []w/heat  Position:  Location:    []  Traction: [] Cervical       []Lumbar                       [] Prone          []Supine                       []Intermittent   []Continuous Lbs:  [] before manual  [] after manual    []  Ultrasound: []Continuous   [] Pulsed                           []1MHz   []3MHz W/cm2:  Location:    []  Iontophoresis with dexamethasone         Location: [] Take home patch   [] In clinic    []  Ice     []  heat  []  Ice massage  []  Laser   []  Anodyne Position:  Location:    []  Laser with stim  []  Other:  Position:  Location:    []  Vasopneumatic Device    []  Right     []  Left  Pre-treatment girth:  Post-treatment girth:  Measured at (location):  Pressure:       [] lo [] med [] hi   Temperature: [] lo [] med [] hi   [] Skin assessment post-treatment:  []intact []redness- no adverse reaction    []redness  adverse reaction:     10 min Therapeutic Exercise:  [x] See flow sheet :   Rationale: increase ROM and increase strength to improve the patients ability to perform ADLs     10 min Therapeutic Activity:  [x]  See flow sheet : sit to stands, transfers, functional standing activities   Rationale: increase ROM, increase strength, improve coordination, improve balance and increase proprioception  to improve the patients ability to improve mobility and ADL performance      14 min Neuromuscular Re-education:  [x]  See flow sheet : balance training   Rationale: increase ROM, increase strength, improve coordination, improve balance and increase proprioception  to improve the patients ability to improve mobility and decrease fall risk    8 min Gait Training:  with SBQC vs RW X 100' with supervision to min A to prevent LOB, emphasis on foot clearance   Rationale: to normalize gait and promote functional independence           With   [x] TE   [x] TA   [x] neuro   [] other: Patient Education: [x] Review HEP    [] Progressed/Changed HEP based on:   [x] positioning   [x] body mechanics   [] transfers   [] heat/ice application    [] other:      Other Objective/Functional Measures:      Pain Level (0-10 scale) post treatment: 0    ASSESSMENT/Changes in Function: Pt is making progress with standing tolerance, but still needs quite a bit of cuing for right foot clearance using both FWW and SBQC. Has more difficulty with using SBQC than FWW. Patient will continue to benefit from skilled PT services to modify and progress therapeutic interventions, address functional mobility deficits, address ROM deficits, address strength deficits, analyze and address soft tissue restrictions, analyze and cue movement patterns, analyze and modify body mechanics/ergonomics, assess and modify postural abnormalities, address imbalance/dizziness and instruct in home and community integration to attain remaining goals.      [x]  See Plan of Care  []  See progress note/recertification  []  See Discharge Summary         Progress towards goals / Updated goals:  Short Term Goals: To be accomplished in 1 weeks: 1. Therapist to establish HEP for strength/gait & balance training to decrease fall risk.               MET  Long Term Goals: To be accomplished in 10 treatments:  1. Patient will be independent with HEP to improve carryover of functional gains with ADLs between visits.             Eval Status:n/a              Reports initial compliance   2. Pt will decrease leonardo on TUG with AD to < 15 seconds to demonstrate decreased fall risk.              Eval Status:21\"              Assess at later visit  3. Pt will increase FOTO score to set d/c points to demonstrate increased ease with ADLs.                Eval Status: FOTO: time constraint at evaluation - to be assessed nv              49 with predicted outcome of 54  4. Pt will increase B hip flexion/abduction to 5/5 with MMT to improve ease with gait & safety with ambulation.    Eval Status:   right hip flexion: 4/5  right hip abduction: 3/5  left hip flexion: 5/5              Left hip abduction: 4+/5              Too soon to see appreciable change    PLAN  []  Upgrade activities as tolerated     [x]  Continue plan of care  []  Update interventions per flow sheet       []  Discharge due to:_  []  Other:_      Toy Stairs, PTA , CSCS 7/7/2021  12:44 PM    Future Appointments   Date Time Provider Leilani Osborn   7/9/2021 12:00 PM Darrin Small PTA [facility]   7/12/2021 12:00 PM Bruce Rowell, PT [facility]   7/14/2021 12:00 PM Darrin Small PTA [facility]   7/19/2021 12:00 PM Bruce Rowell, PT [facility]   7/21/2021 12:00 PM Darrin Small, PTA [facility]   7/26/2021 12:00 PM Bruce Rowell, PT [facility]   7/28/2021 12:00 PM Darrin Small, PTA [facility]

## 2021-07-09 ENCOUNTER — HOSPITAL ENCOUNTER (OUTPATIENT)
Dept: PHYSICAL THERAPY | Age: 61
Discharge: HOME | End: 2021-07-09
Payer: OTHER GOVERNMENT

## 2021-07-09 PROCEDURE — 97530 THERAPEUTIC ACTIVITIES: CPT

## 2021-07-09 PROCEDURE — 97112 NEUROMUSCULAR REEDUCATION: CPT

## 2021-07-09 PROCEDURE — 97110 THERAPEUTIC EXERCISES: CPT

## 2021-07-09 NOTE — PROGRESS NOTES
PT DAILY TREATMENT NOTE     Patient Name: Kalli Ramesh  Date:2021  : 1960  [x]  Patient  Verified  Payor: SELENA / Plan: Celestino Hernandez 74 / Product Type: Florence Cough /    In time:1200  Out time:1247  Total Treatment Time (min): 47  Visit #: 5 of 10    Treatment Area: Weakness of right lower extremity [R29.898]    SUBJECTIVE  Pain Level (0-10 scale): 0  Any medication changes, allergies to medications, adverse drug reactions, diagnosis change, or new procedure performed?: [x] No    [] Yes (see summary sheet for update)  Subjective functional status/changes:   [] No changes reported  \"No pain, but I'm feeling weaker in my right leg today. \"    OBJECTIVE    Modality rationale: patient declined   Min Type Additional Details    [] Estim:  []Unatt       []IFC  []Premod                        []Other:  []w/ice   []w/heat  Position:  Location:    [] Estim: []Att    []TENS instruct  []NMES                    []Other:  []w/US   []w/ice   []w/heat  Position:  Location:    []  Traction: [] Cervical       []Lumbar                       [] Prone          []Supine                       []Intermittent   []Continuous Lbs:  [] before manual  [] after manual    []  Ultrasound: []Continuous   [] Pulsed                           []1MHz   []3MHz W/cm2:  Location:    []  Iontophoresis with dexamethasone         Location: [] Take home patch   [] In clinic    []  Ice     []  heat  []  Ice massage  []  Laser   []  Anodyne Position:  Location:    []  Laser with stim  []  Other:  Position:  Location:    []  Vasopneumatic Device    []  Right     []  Left  Pre-treatment girth:  Post-treatment girth:  Measured at (location):  Pressure:       [] lo [] med [] hi   Temperature: [] lo [] med [] hi   [] Skin assessment post-treatment:  []intact []redness- no adverse reaction    []redness - adverse reaction:     10 min Therapeutic Exercise:  [x] See flow sheet :   Rationale: increase ROM and increase strength to improve the patients ability to perform ADLs    12 min Therapeutic Activity:  [x]  See flow sheet : sit to stands, transfers, functional standing activities   Rationale: increase ROM, increase strength, improve coordination, improve balance and increase proprioception  to improve the patients ability to improve mobility and ADL performance     25 min Neuromuscular Re-education:  [x]  See flow sheet : balance training    Rationale: increase ROM, increase strength, improve coordination, improve balance and increase proprioception  to improve the patients ability to improve mobility and decrease fall risk        With   [x] TE   [x] TA   [x] neuro   [] other: Patient Education: [x] Review HEP    [] Progressed/Changed HEP based on:   [x] positioning   [x] body mechanics   [] transfers   [] heat/ice application    [] other:      Other Objective/Functional Measures:      Pain Level (0-10 scale) post treatment: 0    ASSESSMENT/Changes in Function: Pt reports feeling weaker in his right LE today than previously. MMT for right hip flexion indicated weaker hip flexion than at eval. He was dragging his right foot more today than in previous visits and had significant difficulty with stepping over objects. Recommend to pt and pt's son to schedule a follow up with MD as pt states that he wasn't feeling like normal.     Patient will continue to benefit from skilled PT services to modify and progress therapeutic interventions, address functional mobility deficits, address ROM deficits, address strength deficits, analyze and address soft tissue restrictions, analyze and cue movement patterns, analyze and modify body mechanics/ergonomics, assess and modify postural abnormalities, address imbalance/dizziness and instruct in home and community integration to attain remaining goals.      [x]  See Plan of Care  []  See progress note/recertification  []  See Discharge Summary         Progress towards goals / Updated goals:  Short Term Goals: To be accomplished in 1 weeks:  1. Therapist to establish HEP for strength/gait & balance training to decrease fall risk.               MET  Long Term Goals: To be accomplished in 10 treatments:  1. Patient will be independent with HEP to improve carryover of functional gains with ADLs between visits.             Eval Status:n/a              Reports initial compliance   2. Pt will decrease leonardo on TUG with AD to < 15 seconds to demonstrate decreased fall risk.              Eval Status:21\"              Assess at later visit  3. Pt will increase FOTO score to set d/c points to demonstrate increased ease with ADLs.                Eval Status: FOTO: time constraint at evaluation - to be assessed nv              49 with predicted outcome of 54  4. Pt will increase B hip flexion/abduction to 5/5 with MMT to improve ease with gait & safety with ambulation.    Eval Status:   right hip flexion: 4/5  right hip abduction: 3/5  left hip flexion: 5/5              Left hip abduction: 4+/5              Too soon to see appreciable change    PLAN  []  Upgrade activities as tolerated     [x]  Continue plan of care  []  Update interventions per flow sheet       []  Discharge due to:_  []  Other:_      Nikolai Francois PTA, CSCS 7/9/2021  1:39 PM    Future Appointments   Date Time Provider Leilani Osborn   7/12/2021 12:00 PM Bertha Schirmer, PT [facility]   7/14/2021 12:00 PM Gonzalo Schwartz PTA [facility]   7/19/2021 12:00 PM Bertha Schirmer, PT [facility]   7/21/2021 12:00 PM Gonzalo Schwartz PTA [facility]   7/26/2021 12:00 PM Bertha Schirmer, PT [facility]   7/28/2021 12:00 PM Gonzalo Schwartz [facility]

## 2021-07-12 ENCOUNTER — HOSPITAL ENCOUNTER (OUTPATIENT)
Dept: PHYSICAL THERAPY | Age: 61
Discharge: HOME | End: 2021-07-12
Payer: OTHER GOVERNMENT

## 2021-07-12 PROCEDURE — 97530 THERAPEUTIC ACTIVITIES: CPT

## 2021-07-12 NOTE — PROGRESS NOTES
PT DAILY TREATMENT NOTE     Patient Name: Steph Peacock  Date:2021  : 1960  [x]  Patient  Verified  Payor: SELENA / Plan: Celestino Hernandez 74 / Product Type:  /    In time:1200  Out time:1230  Total Treatment Time (min): 30  Visit #: 6 of 10    Treatment Area: Weakness of right lower extremity [R29.898]    SUBJECTIVE  Pain Level (0-10 scale): 0  Any medication changes, allergies to medications, adverse drug reactions, diagnosis change, or new procedure performed?: [x] No    [] Yes (see summary sheet for update)  Subjective functional status/changes:   [] No changes reported  \"I just dont feel right. \"    OBJECTIVE        30 min Therapeutic Activity:  [x]  See flow sheet :   Rationale: increase ROM, increase strength, improve coordination, improve balance and increase proprioception  to assess patient's need to return to MD.    With   [] TE   [] TA   [] neuro   [] other: Patient Education: [x] Review HEP    [] Progressed/Changed HEP based on:   [] positioning   [] body mechanics   [] transfers   [] heat/ice application    [] other:      Other Objective/Functional Measures:      Pain Level (0-10 scale) post treatment: 0    ASSESSMENT/Changes in Function: Mr. Maretta Dakin arrives today reporting he has not yet f/u with MD's office. Concern for sudden decrease in right LE strength with noted foot drag, increased hyperreflexxia, worsening balance, blurred vision, impaired short term memory, some increased confusion with basic daily tasks (phone usage/bills). Noted right LE weakness present (3/5 hip flexion, knee extension, DF, and 2/5 plantar flexion)  That has worsened since initial evaluation. Advised patient that due to these new onset symptoms I recommended we hold on PT and have him f/u with PCP immediately. Patient to call today to get seen ASAP. I have also called PCP office (referring MD - Dr. Kiran Ravi) to let them know of his new onset symptoms and concern for his well-being.  Patient will hold on PT until cleared to resume. Denies chest pain, shortness of breath, dizziness, pain at all, headache. Patient will continue to benefit from skilled PT services to modify and progress therapeutic interventions, address functional mobility deficits, address ROM deficits, address strength deficits, analyze and address soft tissue restrictions, analyze and cue movement patterns, analyze and modify body mechanics/ergonomics, assess and modify postural abnormalities, address imbalance/dizziness and instruct in home and community integration to attain remaining goals. []  See Plan of Care  []  See progress note/recertification  []  See Discharge Summary         Progress towards goals / Updated goals:  Short Term Goals: To be accomplished in 1 weeks:  1. Therapist to establish HEP for strength/gait & balance training to decrease fall risk.               MET  Long Term Goals: To be accomplished in 10 treatments:  1. Patient will be independent with HEP to improve carryover of functional gains with ADLs between visits.             Eval Status:n/a              Reports initial compliance   2. Pt will decrease leonardo on TUG with AD to < 15 seconds to demonstrate decreased fall risk.              Eval Status:21\"              Assess at later visit  3. Pt will increase FOTO score to set d/c points to demonstrate increased ease with ADLs.                Eval Status: FOTO: time constraint at evaluation - to be assessed nv              49 with predicted outcome of 54  4. Pt will increase B hip flexion/abduction to 5/5 with MMT to improve ease with gait & safety with ambulation.    Eval Status:   right hip flexion: 4/5  right hip abduction: 3/5  left hip flexion: 5/5              Left hip abduction: 4+/5   (3/5 hip flexion, knee extension, DF, and 2/5 plantar flexion)               PLAN  []  Upgrade activities as tolerated     []  Continue plan of care  []  Update interventions per flow sheet       []  Discharge due to:_  [x]  Other:hold PT until MD clears him to resume.      Linda Gray, PT 7/12/2021  12:12 PM    Future Appointments   Date Time Provider Leilain Osborn   7/14/2021 12:00 PM Deaviri Higginbotham, [place] [facility]   7/19/2021 12:00 PM Arnaldo Pierce, PT [facility]   7/21/2021 12:00 PM Stacey Higginbotham, PTA [facility]   7/26/2021 12:00 PM Arnaldo Pierce, PT [facility]   7/28/2021 12:00 PM Stacey Higginbotham, PTA [facility]

## 2021-07-14 ENCOUNTER — APPOINTMENT (OUTPATIENT)
Dept: PHYSICAL THERAPY | Age: 61
End: 2021-07-14
Payer: OTHER GOVERNMENT

## 2021-07-19 ENCOUNTER — APPOINTMENT (OUTPATIENT)
Dept: PHYSICAL THERAPY | Age: 61
End: 2021-07-19
Payer: OTHER GOVERNMENT

## 2021-07-21 ENCOUNTER — APPOINTMENT (OUTPATIENT)
Dept: PHYSICAL THERAPY | Age: 61
End: 2021-07-21
Payer: OTHER GOVERNMENT

## 2021-07-26 ENCOUNTER — APPOINTMENT (OUTPATIENT)
Dept: PHYSICAL THERAPY | Age: 61
End: 2021-07-26
Payer: OTHER GOVERNMENT

## 2021-07-28 ENCOUNTER — APPOINTMENT (OUTPATIENT)
Dept: PHYSICAL THERAPY | Age: 61
End: 2021-07-28
Payer: OTHER GOVERNMENT

## 2021-08-20 NOTE — PROGRESS NOTES
[facility] - [place] [address]  [address], [address] [phone] fax    Discharge Summary  Patient name: Steph Peacock Start of Care: 2021   Referral source: Samir Hamilton MD : 1960               Medical Diagnosis: Weakness of right lower extremity [R29.898]  Payor:  / Plan: Celestino Hernandez 74 / Product Type:  /     Onset Date:               Treatment Diagnosis: right LE weakness   Prior Hospitalization: see medical history Provider#: [id number]   Medications: Verified on Patient summary List    Comorbidities: CA with brain metastases, HTN, dupuytren contractures B hands   Prior Level of Function: retired [employer], recently moved from [place] to live with son and DIL in 2 story home. Uses SBQC for mobility, generalized deconditioning and right LE weakness progressively worsening over the past year. Limited right hand fine motor tasks    Visits from Start of Care: 5    Missed Visits: 0    Reporting Period : 21 to 21    Short Term Goals: To be accomplished in 1 weeks:  1. Therapist to establish HEP for strength/gait & balance training to decrease fall risk.               MET    Long Term Goals: To be accomplished in 10 treatments:- unable to reassess NOT MET  1. Patient will be independent with HEP to improve carryover of functional gains with ADLs between visits.             Eval Status:n/a              Reports initial compliance   2. Pt will decrease leoanrdo on TUG with AD to < 15 seconds to demonstrate decreased fall risk.              Eval Status:21\"              Assess at later visit  3. Pt will increase FOTO score to set d/c points to demonstrate increased ease with ADLs.                Eval Status: FOTO: time constraint at evaluation - to be assessed nv              49 with predicted outcome of 54  4. Pt will increase B hip flexion/abduction to 5/5 with MMT to improve ease with gait & safety with ambulation.    Eval Status:   right hip flexion: 4/5  right hip abduction: 3/5  left hip flexion: 5/5              Left hip abduction: 4+/5              (3/5 hip flexion, knee extension, DF, and 2/5 plantar flexion)               Assessment/Summary of care: mr. Andrade was seen for 5 treatment session for right LE weakness. Unfortunately, the patient was having some red flag symptoms that needed to be screened by the dcotor. He was placed on hold and has not since f/u. We will discharge from outpatient PT for now as he has not been seen in 30+ days.     RECOMMENDATIONS:  [x]Discontinue therapy: [x]Patient has reached or is progressing toward set goals      []Patient is non-compliant or has abdicated      []Due to lack of appreciable progress towards set [address], PT 8/20/2021 11:55 AM

## 2023-10-01 PROBLEM — Z92.3 HISTORY OF RADIATION THERAPY: Status: RESOLVED | Noted: 2021-02-17 | Resolved: 2023-10-01
